# Patient Record
Sex: MALE | Race: WHITE | NOT HISPANIC OR LATINO | Employment: OTHER | URBAN - METROPOLITAN AREA
[De-identification: names, ages, dates, MRNs, and addresses within clinical notes are randomized per-mention and may not be internally consistent; named-entity substitution may affect disease eponyms.]

---

## 2017-09-10 ENCOUNTER — NURSE TRIAGE (OUTPATIENT)
Dept: ADMINISTRATIVE | Facility: CLINIC | Age: 27
End: 2017-09-10

## 2017-09-10 ENCOUNTER — HOSPITAL ENCOUNTER (EMERGENCY)
Facility: HOSPITAL | Age: 27
Discharge: HOME | End: 2017-09-10
Attending: EMERGENCY MEDICINE
Payer: COMMERCIAL

## 2017-09-10 VITALS
DIASTOLIC BLOOD PRESSURE: 85 MMHG | SYSTOLIC BLOOD PRESSURE: 145 MMHG | TEMPERATURE: 99 F | OXYGEN SATURATION: 98 % | RESPIRATION RATE: 18 BRPM | BODY MASS INDEX: 28.12 KG/M2 | HEART RATE: 71 BPM | HEIGHT: 66 IN | WEIGHT: 175 LBS

## 2017-09-10 DIAGNOSIS — R07.9 CHEST PAIN: ICD-10-CM

## 2017-09-10 DIAGNOSIS — F10.20 ALCOHOLISM: Primary | ICD-10-CM

## 2017-09-10 LAB
ALBUMIN SERPL BCP-MCNC: 4.2 G/DL
ALP SERPL-CCNC: 72 U/L
ALT SERPL W/O P-5'-P-CCNC: 40 U/L
AMPHET+METHAMPHET UR QL: NEGATIVE
ANION GAP SERPL CALC-SCNC: 10 MMOL/L
AST SERPL-CCNC: 55 U/L
BARBITURATES UR QL SCN>200 NG/ML: NEGATIVE
BASOPHILS # BLD AUTO: 0.03 K/UL
BASOPHILS NFR BLD: 0.4 %
BENZODIAZ UR QL SCN>200 NG/ML: ABNORMAL
BILIRUB SERPL-MCNC: 0.5 MG/DL
BUN SERPL-MCNC: 9 MG/DL
BZE UR QL SCN: NEGATIVE
CALCIUM SERPL-MCNC: 9.4 MG/DL
CANNABINOIDS UR QL SCN: ABNORMAL
CHLORIDE SERPL-SCNC: 103 MMOL/L
CO2 SERPL-SCNC: 28 MMOL/L
CREAT SERPL-MCNC: 1.1 MG/DL
CREAT UR-MCNC: 416 MG/DL
DIFFERENTIAL METHOD: NORMAL
EOSINOPHIL # BLD AUTO: 0.2 K/UL
EOSINOPHIL NFR BLD: 2.8 %
ERYTHROCYTE [DISTWIDTH] IN BLOOD BY AUTOMATED COUNT: 13.4 %
EST. GFR  (AFRICAN AMERICAN): >60 ML/MIN/1.73 M^2
EST. GFR  (NON AFRICAN AMERICAN): >60 ML/MIN/1.73 M^2
ETHANOL SERPL-MCNC: 94 MG/DL
GLUCOSE SERPL-MCNC: 90 MG/DL
HCT VFR BLD AUTO: 41.3 %
HGB BLD-MCNC: 14.3 G/DL
LYMPHOCYTES # BLD AUTO: 3 K/UL
LYMPHOCYTES NFR BLD: 38.8 %
MCH RBC QN AUTO: 30.8 PG
MCHC RBC AUTO-ENTMCNC: 34.6 G/DL
MCV RBC AUTO: 89 FL
METHADONE UR QL SCN>300 NG/ML: NEGATIVE
MONOCYTES # BLD AUTO: 0.8 K/UL
MONOCYTES NFR BLD: 10.2 %
NEUTROPHILS # BLD AUTO: 3.6 K/UL
NEUTROPHILS NFR BLD: 47.5 %
OPIATES UR QL SCN: NEGATIVE
PCP UR QL SCN>25 NG/ML: NEGATIVE
PLATELET # BLD AUTO: 172 K/UL
PMV BLD AUTO: 9.6 FL
POTASSIUM SERPL-SCNC: 3.7 MMOL/L
PROT SERPL-MCNC: 7.5 G/DL
RBC # BLD AUTO: 4.64 M/UL
SODIUM SERPL-SCNC: 141 MMOL/L
TOXICOLOGY INFORMATION: ABNORMAL
TSH SERPL DL<=0.005 MIU/L-ACNC: 0.77 UIU/ML
WBC # BLD AUTO: 7.63 K/UL

## 2017-09-10 PROCEDURE — 80053 COMPREHEN METABOLIC PANEL: CPT

## 2017-09-10 PROCEDURE — 85025 COMPLETE CBC W/AUTO DIFF WBC: CPT

## 2017-09-10 PROCEDURE — 84443 ASSAY THYROID STIM HORMONE: CPT

## 2017-09-10 PROCEDURE — 93010 ELECTROCARDIOGRAM REPORT: CPT | Mod: ,,, | Performed by: INTERNAL MEDICINE

## 2017-09-10 PROCEDURE — 96374 THER/PROPH/DIAG INJ IV PUSH: CPT

## 2017-09-10 PROCEDURE — 93005 ELECTROCARDIOGRAM TRACING: CPT

## 2017-09-10 PROCEDURE — 99285 EMERGENCY DEPT VISIT HI MDM: CPT | Mod: ,,, | Performed by: EMERGENCY MEDICINE

## 2017-09-10 PROCEDURE — 80307 DRUG TEST PRSMV CHEM ANLYZR: CPT

## 2017-09-10 PROCEDURE — 99284 EMERGENCY DEPT VISIT MOD MDM: CPT | Mod: 25

## 2017-09-10 PROCEDURE — 25000003 PHARM REV CODE 250: Performed by: EMERGENCY MEDICINE

## 2017-09-10 PROCEDURE — 80320 DRUG SCREEN QUANTALCOHOLS: CPT

## 2017-09-10 PROCEDURE — 63600175 PHARM REV CODE 636 W HCPCS: Performed by: EMERGENCY MEDICINE

## 2017-09-10 PROCEDURE — 96361 HYDRATE IV INFUSION ADD-ON: CPT

## 2017-09-10 RX ORDER — B-COMPLEX WITH VITAMIN C
1 TABLET ORAL DAILY
Status: DISCONTINUED | OUTPATIENT
Start: 2017-09-10 | End: 2017-09-11 | Stop reason: HOSPADM

## 2017-09-10 RX ORDER — ONDANSETRON 2 MG/ML
4 INJECTION INTRAMUSCULAR; INTRAVENOUS
Status: COMPLETED | OUTPATIENT
Start: 2017-09-10 | End: 2017-09-10

## 2017-09-10 RX ADMIN — VITAMIN C 1 TABLET: TAB at 11:09

## 2017-09-10 RX ADMIN — SODIUM CHLORIDE 1000 ML: 0.9 INJECTION, SOLUTION INTRAVENOUS at 10:09

## 2017-09-10 RX ADMIN — ONDANSETRON 4 MG: 2 INJECTION INTRAMUSCULAR; INTRAVENOUS at 10:09

## 2017-09-10 NOTE — TELEPHONE ENCOUNTER
Reason for Disposition   Feeling very shaky (i.e., visible tremors of hands)   [1] Vomiting or dry heaves AND [2] occurring frequently (i.e., vomiting > 4 times in last 4 hours)    Protocols used: ST ALCOHOL ABUSE AND [id number]    Patient's fiance called with concerns that he has the shakes from alcohol withdrawal. Advised Ms. Hussein to bring the patient to the ED, she verbalized understanding.

## 2017-09-11 NOTE — ED NOTES
LOC: The patient is awake, alert and aware of environment with an appropriate affect, the patient is oriented x 3 and speaking appropriately.  APPEARANCE: Patient resting comfortably and in no acute distress, patient is clean and well groomed, patient's clothing is properly fastened.  SKIN: The skin is warm and dry, patient has normal skin turgor and moist mucus membranes, scattered scrapes to hands and bridge of nose  MUSKULOSKELETAL: Patient moving all extremities well, no obvious swelling or deformities noted.  RESPIRATORY: Airway is open and patent, respirations are spontaneous, patient has a normal effort and rate.   NEURO: No neuro deficits. Speech is clear.  PAIN: Patient complains of to top of head

## 2017-09-11 NOTE — ED PROVIDER NOTES
"Encounter Date: 9/10/2017    SCRIBE #1 NOTE: I, Mary Byrne, am scribing for, and in the presence of,  Dr. Ramirez. I have scribed the entire note.       History     Chief Complaint   Patient presents with    Withdrawal     pt reports that he is  withdrawing from alcohol - last drink friday - typically drinks 1 L or more of alcohol .  pt has hx of seizures from alcohol withdrawal. he reports n/v. he  reports LOC this morning. he reports chest pain and dry heaving.  he was told to come to [facility] for  detox     Time seen by provider: 9:46 PM    This is a 26 y.o. male who presents per nurse hotline recommendations with complaint of Et OH withdrawal. Pt endorses generalized weakness, diaphoresis, and vomiting. Symptoms exacerbated with PO intake and he has been unable to "keep any fluids down." Pt also complains of abrasions to his right hand after punching a mirror last night, he was upset. Hx of seizure Easter 2017 and thinks he may have had one today, describes syncope and noted tongue swelling afterwards. Pt reports he has bad anxiety and drinks because of this, usually leads to anger issues and he would like to stop drinking. Hx of two years of alcoholism. Patient's fiance called [facility] but they did not have a bed available tonight.  Last had a drink two days ago.       The history is provided by the patient, medical records and a significant other.     Review of patient's allergies indicates:  No Known Allergies  Past Medical History:   Diagnosis Date    ADHD (attention deficit hyperactivity disorder)     Alcohol abuse     Alcohol dependence     Marijuana use     Seizures      Past Surgical History:   Procedure Laterality Date    CIRCUMCISION      EAR TUBE REMOVAL      HAND SURGERY       Family History   Problem Relation Age of Onset    Heart disease Mother     Cancer Mother     Heart disease Father     Heart disease Maternal Grandmother     Heart disease Maternal Grandfather     Diabetes Maternal " Grandfather     Heart disease Paternal Grandmother     Diabetes Paternal Grandmother     Heart disease Paternal Grandfather      Social History   Substance Use Topics    Smoking status: Never Smoker    Smokeless tobacco: Never Used    Alcohol use Yes      Comment: A 5th plus daily     Review of Systems   Constitutional: Positive for diaphoresis.   HENT:        Positive for tongue swelling.    Eyes: Negative for visual disturbance.   Respiratory: Negative for cough and shortness of breath.    Cardiovascular: Negative for chest pain.   Gastrointestinal: Positive for vomiting.   Musculoskeletal: Negative for back pain.   Skin: Negative for rash.   Neurological: Positive for seizures (suspected), syncope (earlier today) and weakness.   Psychiatric/Behavioral: Negative for confusion.       Physical Exam     Initial Vitals [09/10/17 1921]   BP Pulse Resp Temp SpO2   (!) 144/85 99 18 98.8 °F (37.1 °C) 99 %      MAP       104.67         Physical Exam    Nursing note and vitals reviewed.  Constitutional: He appears well-developed and well-nourished. He is not diaphoretic. No distress.   Pt appears calm and does not appear to be in alcohol withdrawal. He appears well hydrated.    HENT:   Head: Normocephalic and atraumatic.   Mouth/Throat: Oropharynx is clear and moist.   Eyes: Conjunctivae and EOM are normal. Pupils are equal, round, and reactive to light.   Neck: Normal range of motion. Neck supple. No JVD present.   Cardiovascular: Normal rate, regular rhythm and normal heart sounds.   No murmur heard.  Pulmonary/Chest: Breath sounds normal. He has no wheezes. He has no rhonchi. He has no rales. He exhibits no tenderness.   Abdominal: Soft. Bowel sounds are normal. He exhibits no distension and no mass. There is no tenderness. There is no rebound and no guarding.   Musculoskeletal: Normal range of motion. He exhibits no edema or tenderness.   Neurological: He is alert and oriented to person, place, and time. He has  normal strength. No cranial nerve deficit or sensory deficit.   Speech clear. No tremors.   Skin: Skin is warm and dry. No rash noted.   Abrasions to hands with full ROM and good tendon function.    Psychiatric: He has a normal mood and affect.         ED Course   Procedures  Labs Reviewed   COMPREHENSIVE METABOLIC PANEL - Abnormal; Notable for the following:        Result Value    AST 55 (*)     All other components within normal limits   ALCOHOL,MEDICAL (ETHANOL) - Abnormal; Notable for the following:     Alcohol, Medical, Serum 94 (*)     All other components within normal limits   DRUG SCREEN PANEL, URINE EMERGENCY - Abnormal; Notable for the following:     Creatinine, Random Ur 416.0 (*)     All other components within normal limits   CBC W/ AUTO DIFFERENTIAL   TSH     EKG Readings: (Independently Interpreted)   NSR at 76. No ischemic changes.       X-Rays:   Independently Interpreted Readings:   Chest X-Ray: Normal heart. Normal lungs.      Medical Decision Making:   History:   Old Medical Records: I decided to obtain old medical records.  Initial Assessment:   Pt with alcoholism states he feels as though he is alcohol withdrawal; however he has normal vital signs, is not diaphoretic, does not appear to be in withdrawal, and is clearly not delirious. Pt appears well hydrated. Will check labs, give fluids, and reassess.   Independently Interpreted Test(s):   I have ordered and independently interpreted X-rays - see prior notes.  I have ordered and independently interpreted EKG Reading(s) - see prior notes  Clinical Tests:   Lab Tests: Ordered and Reviewed  Radiological Study: Ordered and Reviewed  Medical Tests: Ordered and Reviewed  ED Management:  10:42 PM  Labs reviewed: Alcohol level in the 90's. He has showed no signs of withdrawal at this point. Pt is stable for discharge to follow up with behavioral unit or [facility].     The chest pain is atypical.  Highly doubt PE, acs, aorta, pneumonia, ptx.             Scribe Attestation:   Scribe #1: I performed the above scribed service and the documentation accurately describes the services I performed. I attest to the accuracy of the note.    Attending Attestation:           Physician Attestation for Scribe:  Physician Attestation Statement for Scribe #1: I, Dr. Ramirez, reviewed documentation, as scribed by Mary Byrne in my presence, and it is both accurate and complete.                 ED Course      Clinical Impression:   The primary encounter diagnosis was Alcoholism. A diagnosis of Chest pain was also pertinent to this visit.    Disposition:   Disposition: Discharged  Condition: Stable                        Joel Ramirez MD  09/15/17 0676

## 2017-09-11 NOTE — ED TRIAGE NOTES
Pt was told by nurse on 24hr nurse hotline to come to ER for alcohol withdrawal.    Pt last drank on Friday. Pt usually drinks a 5th plus daily.    Pt reports possible withdrawal seizure today. Pt reports blacking out, hitting his head and nose and biting the inside of his mouth. Pt has history of withdrawal seizures with the last one being Easter Sunday of this year.

## 2017-09-11 NOTE — PROVIDER PROGRESS NOTES - EMERGENCY DEPT.
Encounter Date: 9/10/2017    ED Physician Progress Notes       SCRIBE NOTE: I, Mary Byrne, am scribing for, and in the presence of,  Dr. Ramirez.  Physician Statement: I, Dr. Ramirez, personally performed the services described in this documentation as scribed by Mary Byrne in my presence, and it is both accurate and complete.      EKG - STEMI Decision  Initial Reading: No STEMI present.

## 2018-04-16 DIAGNOSIS — G56.01 CARPAL TUNNEL SYNDROME ON RIGHT: Primary | ICD-10-CM

## 2018-04-16 DIAGNOSIS — G56.21 LESION OF RIGHT ULNAR NERVE: ICD-10-CM

## 2018-04-17 ENCOUNTER — CLINICAL SUPPORT (OUTPATIENT)
Dept: REHABILITATION | Facility: HOSPITAL | Age: 28
End: 2018-04-17
Payer: MEDICAID

## 2018-04-17 DIAGNOSIS — G56.01 RIGHT CARPAL TUNNEL SYNDROME: ICD-10-CM

## 2018-04-17 DIAGNOSIS — M79.601 RIGHT ARM PAIN: ICD-10-CM

## 2018-04-17 DIAGNOSIS — G56.21 CUBITAL TUNNEL SYNDROME ON RIGHT: ICD-10-CM

## 2018-04-17 PROCEDURE — L3702 EO W/O JOINTS CF: HCPCS | Mod: PN

## 2018-04-17 NOTE — PLAN OF CARE
Occupational Therapy  Orthosis Daily Note / Certification of Medical Necessity     Date: 4/17/2018  Name: Julián Whiteside  YOB: 1990  Chart Number: [id number]  Referring Physician: Edinson Sanchez  Date of Order: 4/5/2018  Diagnosis:   1. Right carpal tunnel syndrome     2. Cubital tunnel syndrome on right     3. Right arm pain       ICD 10: G56.01, G56.21, M79.601  Date of Onset/Surgery: UNKNOWN  Surgical Procedure: Ulnar nerve decompression with transposition at elbow and median nerve decompression at wrist   Past Medical History/Precautions:   Past Medical History:   Diagnosis Date    ADHD (attention deficit hyperactivity disorder)     Alcohol abuse     Alcohol dependence     Marijuana use     Seizures      Medical History: The patient is a 27 y.o. year old male with the above documented diagnosis. Patient was seen in MD office on 3/29/2018 for suture removal and presents today for custom orthosis.     Subjective   The patient verbalized understanding of all instructions and reported comfortable fit of orthosis.     Objective   Purpose of Orthosis  () Protect Recent Injury   (x) Protect Surgical Procedure  (x) Maintain Joint Positioning  () Increase Motion    Custom Fabricated Orthosis  The custom orthosis was fabricated as requested using low-temperature plastic material. A pattern was measured then cut and custom molded to accommodate this individual patient.   (x) Long Arm Orthosis  () Forearm-Based Orthosis  () Radial-Based Orthosis  () Ulnar-Based Orthosis  () Hand-Based Orthosis  () Finger-Based Orthosis  () Full Circumference  (x) Half Circumference  (x) Static  () Static Progressive  () Dynamic    [facility] Level II Code and Description   EO - Custom static elbow orthosis    Home Exercise Program   See patient instructions section for orthosis instructions.  Patient was instructed verbally, signed, and provided with written proof of delivery as well as information regarding wear  schedule, care, and precautions of orthosis.    Assessment   The orthosis appeared to fit well with no problems noted. There were no complaints of discomfort from the patient at this time. The patient demonstrated competency with independently doffing and donning orthosis.    Plan   Frequency and Duration: 1x visit for orthosis fabrication   Orthosis to be worn at all times with removal for hygiene only.   Orthosis checks PRN.    I certify that the orthosis was performed or under the direct supervision of a qualified Occupational Therapist.   MAXINE Reza, MARTA, CHT       I certify that I have examined the above named patient and that the orthosis(s) are medically necessary.  Date:       Physician:  ALICIA#:

## 2018-04-17 NOTE — PATIENT INSTRUCTIONS
Orthosis Instructions and Proof of Delivery   Date: 4/17/2018  Name: Julián Whiteside  MRN: [id number]  YOB: 1990  Referring Provider: Edinson Sanchez III*        [facility] [place] Level II Code and Description :     Orthosis Information  (X) Custom Fabricated               (X) Right                                     ()X Static                                       Orthosis Instructions    (X) Remove for hygiene only    Cleaning and Maintenance  Keep your orthosis away from any heat sources. Do not leave in your car.  Keep your orthosis away from pets.  You may clean your orthosis with cool water and soap or a mild cleanser.  Your orthosis may need adjustments due to changes in your medical condition (swelling, dressing size, additional surgery, etc.)  Monitor your skin color and integrity.  Do not try to adjust the orthosis on your own.     If you have any questions or concerns, please contact the therapy office.     I, Julián Whiteside , have personally received the above described orthosis along with instructions on the wear, care, and precautions related to this orthosis. I understand that I am responsible for payment to [facility] of my deductible, coinsurance, or other portion of my charges not paid in full by my insurance plans, including any item that is not covered under the insurance plan.     Signature: _________________________________ Date: __________________    Therapist: MAXINE Reza LOTR, CHT

## 2022-03-22 ENCOUNTER — TELEPHONE (OUTPATIENT)
Dept: OPHTHALMOLOGY | Facility: CLINIC | Age: 32
End: 2022-03-22
Payer: MEDICAID

## 2022-03-22 ENCOUNTER — OFFICE VISIT (OUTPATIENT)
Dept: OPHTHALMOLOGY | Facility: CLINIC | Age: 32
End: 2022-03-22
Payer: COMMERCIAL

## 2022-03-22 DIAGNOSIS — S05.32XA CONJUNCTIVAL LACERATION, LEFT, INITIAL ENCOUNTER: Primary | ICD-10-CM

## 2022-03-22 PROCEDURE — 1160F RVW MEDS BY RX/DR IN RCRD: CPT | Mod: CPTII,S$GLB,, | Performed by: OPHTHALMOLOGY

## 2022-03-22 PROCEDURE — 1160F PR REVIEW ALL MEDS BY PRESCRIBER/CLIN PHARMACIST DOCUMENTED: ICD-10-PCS | Mod: CPTII,S$GLB,, | Performed by: OPHTHALMOLOGY

## 2022-03-22 PROCEDURE — 99999 PR PBB SHADOW E&M-NEW PATIENT-LVL II: ICD-10-PCS | Mod: PBBFAC,,, | Performed by: OPHTHALMOLOGY

## 2022-03-22 PROCEDURE — 99202 PR OFFICE/OUTPT VISIT, NEW, LEVL II, 15-29 MIN: ICD-10-PCS | Mod: S$GLB,,, | Performed by: OPHTHALMOLOGY

## 2022-03-22 PROCEDURE — 1159F PR MEDICATION LIST DOCUMENTED IN MEDICAL RECORD: ICD-10-PCS | Mod: CPTII,S$GLB,, | Performed by: OPHTHALMOLOGY

## 2022-03-22 PROCEDURE — 99202 OFFICE O/P NEW SF 15 MIN: CPT | Mod: S$GLB,,, | Performed by: OPHTHALMOLOGY

## 2022-03-22 PROCEDURE — 99999 PR PBB SHADOW E&M-NEW PATIENT-LVL II: CPT | Mod: PBBFAC,,, | Performed by: OPHTHALMOLOGY

## 2022-03-22 PROCEDURE — 99202 OFFICE O/P NEW SF 15 MIN: CPT | Mod: PBBFAC | Performed by: OPHTHALMOLOGY

## 2022-03-22 PROCEDURE — 1159F MED LIST DOCD IN RCRD: CPT | Mod: CPTII,S$GLB,, | Performed by: OPHTHALMOLOGY

## 2022-03-22 RX ORDER — OFLOXACIN 3 MG/ML
1 SOLUTION/ DROPS OPHTHALMIC 4 TIMES DAILY
Qty: 5 ML | Refills: 0 | Status: SHIPPED | OUTPATIENT
Start: 2022-03-22 | End: 2022-03-27

## 2022-03-22 NOTE — PROGRESS NOTES
HPI     Triage pt  Patient states OS hit w/object while weed eating x 2 hours ago.  Pt states OS eye pain--7 on pain scale.  Didn't try to rinse out.  Vision stable.    I have personally interviewed the patient, reviewed the history and   examined the patient and agree with the technician's &/or resident's exam,   assessment and plan.       Last edited by Chao Dunham MD on 3/22/2022  2:56 PM. (History)            Assessment /Plan     For exam results, see Encounter Report.    Conjunctival laceration, left, initial encounter  -     ofloxacin (OCUFLOX) 0.3 % ophthalmic solution; Place 1 drop into the left eye 4 (four) times daily. for 5 days  Dispense: 5 mL; Refill: 0      Use drops as above.  One drop of cyclopentolate left eye now.  Return one week if concerns,.

## 2022-03-22 NOTE — TELEPHONE ENCOUNTER
----- Message from Adriana Sneed sent at 3/22/2022 12:44 PM CDT -----  Regarding: appt  Patient states that he sent a picture of his eye injury to Dr. Jennings.  And he was informed by the doctor that someone from triage would reach out to him on whether the patient should go to the ER or just have an appointment sent up.      Patient states that he is in a lot of pain.      Julián  @  [phone]